# Patient Record
Sex: MALE | Race: WHITE | NOT HISPANIC OR LATINO | Employment: UNEMPLOYED | URBAN - METROPOLITAN AREA
[De-identification: names, ages, dates, MRNs, and addresses within clinical notes are randomized per-mention and may not be internally consistent; named-entity substitution may affect disease eponyms.]

---

## 2017-01-27 ENCOUNTER — INFUSION - HEALTHEAST (OUTPATIENT)
Dept: INFUSION THERAPY | Facility: HOSPITAL | Age: 47
End: 2017-01-27

## 2017-01-27 DIAGNOSIS — M81.0 OSTEOPOROSIS: ICD-10-CM

## 2017-01-27 LAB
CREAT SERPL-MCNC: 0.64 MG/DL (ref 0.7–1.3)
GFR SERPL CREATININE-BSD FRML MDRD: >60 ML/MIN/1.73M2

## 2017-04-28 ENCOUNTER — INFUSION - HEALTHEAST (OUTPATIENT)
Dept: INFUSION THERAPY | Facility: HOSPITAL | Age: 47
End: 2017-04-28

## 2017-04-28 DIAGNOSIS — M81.0 OSTEOPOROSIS: ICD-10-CM

## 2017-04-28 LAB
CREAT SERPL-MCNC: 0.67 MG/DL (ref 0.7–1.3)
GFR SERPL CREATININE-BSD FRML MDRD: >60 ML/MIN/1.73M2

## 2017-05-30 ENCOUNTER — RECORDS - HEALTHEAST (OUTPATIENT)
Dept: ADMINISTRATIVE | Facility: OTHER | Age: 47
End: 2017-05-30

## 2017-06-02 ENCOUNTER — HOSPITAL ENCOUNTER (OUTPATIENT)
Dept: RADIOLOGY | Facility: HOSPITAL | Age: 47
Discharge: HOME | End: 2017-06-02
Attending: FAMILY MEDICINE

## 2017-06-02 DIAGNOSIS — J18.9 PNEUMONIA: ICD-10-CM

## 2017-06-21 ENCOUNTER — RECORDS - HEALTHEAST (OUTPATIENT)
Dept: ADMINISTRATIVE | Facility: OTHER | Age: 47
End: 2017-06-21

## 2017-07-07 ENCOUNTER — HOSPITAL ENCOUNTER (OUTPATIENT)
Dept: RADIOLOGY | Facility: HOSPITAL | Age: 47
Discharge: HOME | End: 2017-07-07
Attending: FAMILY MEDICINE

## 2017-07-07 DIAGNOSIS — R93.89 ABNORMAL CHEST X-RAY: ICD-10-CM

## 2017-07-27 ENCOUNTER — RECORDS - HEALTHEAST (OUTPATIENT)
Dept: ADMINISTRATIVE | Facility: OTHER | Age: 47
End: 2017-07-27

## 2017-07-27 ENCOUNTER — AMBULATORY - HEALTHEAST (OUTPATIENT)
Dept: INFUSION THERAPY | Facility: HOSPITAL | Age: 47
End: 2017-07-27

## 2017-07-27 ENCOUNTER — COMMUNICATION - HEALTHEAST (OUTPATIENT)
Dept: INFUSION THERAPY | Facility: HOSPITAL | Age: 47
End: 2017-07-27

## 2017-07-27 DIAGNOSIS — M81.0 OSTEOPOROSIS: ICD-10-CM

## 2017-07-28 ENCOUNTER — INFUSION - HEALTHEAST (OUTPATIENT)
Dept: INFUSION THERAPY | Facility: HOSPITAL | Age: 47
End: 2017-07-28

## 2017-07-28 DIAGNOSIS — M81.0 OSTEOPOROSIS: ICD-10-CM

## 2017-07-28 LAB
CREAT SERPL-MCNC: 0.63 MG/DL (ref 0.7–1.3)
GFR SERPL CREATININE-BSD FRML MDRD: >60 ML/MIN/1.73M2

## 2017-10-24 ENCOUNTER — RECORDS - HEALTHEAST (OUTPATIENT)
Dept: ADMINISTRATIVE | Facility: OTHER | Age: 47
End: 2017-10-24

## 2018-02-20 ENCOUNTER — RECORDS - HEALTHEAST (OUTPATIENT)
Dept: ADMINISTRATIVE | Facility: OTHER | Age: 48
End: 2018-02-20

## 2018-03-13 ENCOUNTER — RECORDS - HEALTHEAST (OUTPATIENT)
Dept: ADMINISTRATIVE | Facility: OTHER | Age: 48
End: 2018-03-13

## 2018-03-30 ENCOUNTER — RECORDS - HEALTHEAST (OUTPATIENT)
Dept: ADMINISTRATIVE | Facility: OTHER | Age: 48
End: 2018-03-30

## 2018-04-02 ENCOUNTER — COMMUNICATION - HEALTHEAST (OUTPATIENT)
Dept: ADMINISTRATIVE | Facility: CLINIC | Age: 48
End: 2018-04-02

## 2018-04-02 ENCOUNTER — RECORDS - HEALTHEAST (OUTPATIENT)
Dept: ADMINISTRATIVE | Facility: OTHER | Age: 48
End: 2018-04-02

## 2018-04-10 ENCOUNTER — RECORDS - HEALTHEAST (OUTPATIENT)
Dept: ADMINISTRATIVE | Facility: OTHER | Age: 48
End: 2018-04-10

## 2018-06-20 ENCOUNTER — AMBULATORY - HEALTHEAST (OUTPATIENT)
Dept: LAB | Facility: HOSPITAL | Age: 48
End: 2018-06-20

## 2018-06-20 DIAGNOSIS — G40.802 CURSIVE EPILEPSY (H): ICD-10-CM

## 2018-06-20 DIAGNOSIS — Z51.81 ENCOUNTER FOR THERAPEUTIC DRUG MONITORING: ICD-10-CM

## 2018-06-20 LAB
ALBUMIN SERPL-MCNC: 3.1 G/DL (ref 3.5–5)
ALP SERPL-CCNC: 71 U/L (ref 45–120)
ALT SERPL W P-5'-P-CCNC: 10 U/L (ref 0–45)
AST SERPL W P-5'-P-CCNC: 23 U/L (ref 0–40)
BASOPHILS # BLD AUTO: 0 THOU/UL (ref 0–0.2)
BASOPHILS NFR BLD AUTO: 0 % (ref 0–2)
BILIRUB DIRECT SERPL-MCNC: 0.2 MG/DL
BILIRUB SERPL-MCNC: 0.3 MG/DL (ref 0–1)
CARBAMAZEPINE SERPL-MCNC: 5.8 UG/ML (ref 4–12)
EOSINOPHIL # BLD AUTO: 0 THOU/UL (ref 0–0.4)
EOSINOPHIL NFR BLD AUTO: 0 % (ref 0–6)
ERYTHROCYTE [DISTWIDTH] IN BLOOD BY AUTOMATED COUNT: 12.6 % (ref 11–14.5)
HCT VFR BLD AUTO: 39.8 % (ref 40–54)
HGB BLD-MCNC: 13.2 G/DL (ref 14–18)
LEVETIRACETAM (KEPPRA): 28.6 UG/ML (ref 6–46)
LYMPHOCYTES # BLD AUTO: 1.4 THOU/UL (ref 0.8–4.4)
LYMPHOCYTES NFR BLD AUTO: 15 % (ref 20–40)
MCH RBC QN AUTO: 32.8 PG (ref 27–34)
MCHC RBC AUTO-ENTMCNC: 33.2 G/DL (ref 32–36)
MCV RBC AUTO: 99 FL (ref 80–100)
MONOCYTES # BLD AUTO: 1.4 THOU/UL (ref 0–0.9)
MONOCYTES NFR BLD AUTO: 15 % (ref 2–10)
NEUTROPHILS # BLD AUTO: 6.5 THOU/UL (ref 2–7.7)
NEUTROPHILS NFR BLD AUTO: 70 % (ref 50–70)
PLATELET # BLD AUTO: 142 THOU/UL (ref 140–440)
PMV BLD AUTO: 8.8 FL (ref 8.5–12.5)
PROT SERPL-MCNC: 8.3 G/DL (ref 6–8)
RBC # BLD AUTO: 4.02 MILL/UL (ref 4.4–6.2)
VALPROATE SERPL-MCNC: 90.5 UG/ML (ref 50–150)
WBC: 9.4 THOU/UL (ref 4–11)

## 2018-06-21 LAB
SPECIMEN STATUS: NORMAL
VALPROATE FREE SERPL-MCNC: 18.9 UG/ML (ref 6–20)

## 2018-06-27 ENCOUNTER — RECORDS - HEALTHEAST (OUTPATIENT)
Dept: ADMINISTRATIVE | Facility: OTHER | Age: 48
End: 2018-06-27

## 2018-07-17 ENCOUNTER — RECORDS - HEALTHEAST (OUTPATIENT)
Dept: ADMINISTRATIVE | Facility: OTHER | Age: 48
End: 2018-07-17

## 2018-07-17 ENCOUNTER — OFFICE VISIT - HEALTHEAST (OUTPATIENT)
Dept: ENDOCRINOLOGY | Facility: CLINIC | Age: 48
End: 2018-07-17

## 2018-07-17 DIAGNOSIS — M81.0 OSTEOPOROSIS: ICD-10-CM

## 2018-07-19 ENCOUNTER — COMMUNICATION - HEALTHEAST (OUTPATIENT)
Dept: ENDOCRINOLOGY | Facility: CLINIC | Age: 48
End: 2018-07-19

## 2018-07-27 ENCOUNTER — AMBULATORY - HEALTHEAST (OUTPATIENT)
Dept: ENDOCRINOLOGY | Facility: CLINIC | Age: 48
End: 2018-07-27

## 2018-07-27 DIAGNOSIS — M81.0 OSTEOPOROSIS: ICD-10-CM

## 2018-09-13 ENCOUNTER — RECORDS - HEALTHEAST (OUTPATIENT)
Dept: LAB | Facility: HOSPITAL | Age: 48
End: 2018-09-13

## 2018-09-13 LAB
ALBUMIN SERPL-MCNC: 3.2 G/DL (ref 3.5–5)
ALP SERPL-CCNC: 84 U/L (ref 45–120)
ALT SERPL W P-5'-P-CCNC: 11 U/L (ref 0–45)
ANION GAP SERPL CALCULATED.3IONS-SCNC: 7 MMOL/L (ref 5–18)
AST SERPL W P-5'-P-CCNC: 24 U/L (ref 0–40)
BASOPHILS # BLD AUTO: 0 THOU/UL (ref 0–0.2)
BASOPHILS NFR BLD AUTO: 0 % (ref 0–2)
BILIRUB SERPL-MCNC: 0.4 MG/DL (ref 0–1)
BUN SERPL-MCNC: 22 MG/DL (ref 8–22)
CALCIUM SERPL-MCNC: 9.6 MG/DL (ref 8.5–10.5)
CHLORIDE BLD-SCNC: 104 MMOL/L (ref 98–107)
CO2 SERPL-SCNC: 34 MMOL/L (ref 22–31)
CREAT SERPL-MCNC: 0.66 MG/DL (ref 0.7–1.3)
EOSINOPHIL # BLD AUTO: 0.1 THOU/UL (ref 0–0.4)
EOSINOPHIL NFR BLD AUTO: 1 % (ref 0–6)
ERYTHROCYTE [DISTWIDTH] IN BLOOD BY AUTOMATED COUNT: 12.6 % (ref 11–14.5)
GFR SERPL CREATININE-BSD FRML MDRD: >60 ML/MIN/1.73M2
GLUCOSE BLD-MCNC: 104 MG/DL (ref 70–125)
HCT VFR BLD AUTO: 41.7 % (ref 40–54)
HGB BLD-MCNC: 13.4 G/DL (ref 14–18)
LYMPHOCYTES # BLD AUTO: 1.6 THOU/UL (ref 0.8–4.4)
LYMPHOCYTES NFR BLD AUTO: 23 % (ref 20–40)
MCH RBC QN AUTO: 32.3 PG (ref 27–34)
MCHC RBC AUTO-ENTMCNC: 32.1 G/DL (ref 32–36)
MCV RBC AUTO: 101 FL (ref 80–100)
MONOCYTES # BLD AUTO: 0.7 THOU/UL (ref 0–0.9)
MONOCYTES NFR BLD AUTO: 10 % (ref 2–10)
NEUTROPHILS # BLD AUTO: 4.5 THOU/UL (ref 2–7.7)
NEUTROPHILS NFR BLD AUTO: 65 % (ref 50–70)
PLATELET # BLD AUTO: 211 THOU/UL (ref 140–440)
PMV BLD AUTO: 9.2 FL (ref 8.5–12.5)
POTASSIUM BLD-SCNC: 4.1 MMOL/L (ref 3.5–5)
PROT SERPL-MCNC: 8.5 G/DL (ref 6–8)
RBC # BLD AUTO: 4.15 MILL/UL (ref 4.4–6.2)
SODIUM SERPL-SCNC: 145 MMOL/L (ref 136–145)
TSH SERPL DL<=0.005 MIU/L-ACNC: 1.47 UIU/ML (ref 0.3–5)
WBC: 6.9 THOU/UL (ref 4–11)

## 2018-09-19 ENCOUNTER — AMBULATORY - HEALTHEAST (OUTPATIENT)
Dept: LAB | Facility: CLINIC | Age: 48
End: 2018-09-19

## 2018-09-19 DIAGNOSIS — R63.4 ABNORMAL WEIGHT LOSS: ICD-10-CM

## 2018-09-19 DIAGNOSIS — R41.82 ALTERED MENTAL STATUS: ICD-10-CM

## 2018-09-19 LAB
ALBUMIN UR-MCNC: NEGATIVE MG/DL
APPEARANCE UR: CLEAR
BILIRUB UR QL STRIP: NEGATIVE
COLOR UR AUTO: ABNORMAL
GLUCOSE UR STRIP-MCNC: NEGATIVE MG/DL
HGB UR QL STRIP: NEGATIVE
KETONES UR STRIP-MCNC: NEGATIVE MG/DL
LEUKOCYTE ESTERASE UR QL STRIP: NEGATIVE
NITRATE UR QL: NEGATIVE
PH UR STRIP: 6 [PH] (ref 4.5–8)
SP GR UR STRIP: 1.02 (ref 1–1.03)
UROBILINOGEN UR STRIP-ACNC: ABNORMAL

## 2018-09-20 LAB — BACTERIA SPEC CULT: NO GROWTH

## 2018-10-30 ENCOUNTER — RECORDS - HEALTHEAST (OUTPATIENT)
Dept: LAB | Facility: CLINIC | Age: 48
End: 2018-10-30

## 2018-10-30 LAB
ALBUMIN SERPL-MCNC: 3.2 G/DL (ref 3.5–5)
ALP SERPL-CCNC: 88 U/L (ref 45–120)
ALT SERPL W P-5'-P-CCNC: 13 U/L (ref 0–45)
ANION GAP SERPL CALCULATED.3IONS-SCNC: 11 MMOL/L (ref 5–18)
AST SERPL W P-5'-P-CCNC: 28 U/L (ref 0–40)
BILIRUB SERPL-MCNC: 0.3 MG/DL (ref 0–1)
BUN SERPL-MCNC: 26 MG/DL (ref 8–22)
CALCIUM SERPL-MCNC: 9.4 MG/DL (ref 8.5–10.5)
CHLORIDE BLD-SCNC: 106 MMOL/L (ref 98–107)
CO2 SERPL-SCNC: 25 MMOL/L (ref 22–31)
CREAT SERPL-MCNC: 0.68 MG/DL (ref 0.7–1.3)
GFR SERPL CREATININE-BSD FRML MDRD: >60 ML/MIN/1.73M2
GLUCOSE BLD-MCNC: 87 MG/DL (ref 70–125)
POTASSIUM BLD-SCNC: 3.8 MMOL/L (ref 3.5–5)
PROT SERPL-MCNC: 8.2 G/DL (ref 6–8)
SODIUM SERPL-SCNC: 142 MMOL/L (ref 136–145)

## 2018-11-01 LAB
ERYTHROCYTE [DISTWIDTH] IN BLOOD BY AUTOMATED COUNT: 12.4 % (ref 11–14.5)
HCT VFR BLD AUTO: 40.5 % (ref 40–54)
HGB BLD-MCNC: 13.2 G/DL (ref 14–18)
MCH RBC QN AUTO: 32.4 PG (ref 27–34)
MCHC RBC AUTO-ENTMCNC: 32.6 G/DL (ref 32–36)
MCV RBC AUTO: 99 FL (ref 80–100)
PLATELET # BLD AUTO: 159 THOU/UL (ref 140–440)
PMV BLD AUTO: 8.5 FL (ref 8.5–12.5)
RBC # BLD AUTO: 4.08 MILL/UL (ref 4.4–6.2)
WBC: 4.7 THOU/UL (ref 4–11)

## 2019-01-25 ENCOUNTER — COMMUNICATION - HEALTHEAST (OUTPATIENT)
Dept: ENDOCRINOLOGY | Facility: CLINIC | Age: 49
End: 2019-01-25

## 2019-02-06 ENCOUNTER — AMBULATORY - HEALTHEAST (OUTPATIENT)
Dept: ENDOCRINOLOGY | Facility: CLINIC | Age: 49
End: 2019-02-06

## 2019-07-22 ENCOUNTER — AMBULATORY - HEALTHEAST (OUTPATIENT)
Dept: LAB | Facility: HOSPITAL | Age: 49
End: 2019-07-22

## 2019-07-22 DIAGNOSIS — Z51.81 ENCOUNTER FOR THERAPEUTIC DRUG LEVEL MONITORING: ICD-10-CM

## 2019-07-22 LAB
ALBUMIN SERPL-MCNC: 2.9 G/DL (ref 3.5–5)
ALP SERPL-CCNC: 82 U/L (ref 45–120)
ALT SERPL W P-5'-P-CCNC: 10 U/L (ref 0–45)
AST SERPL W P-5'-P-CCNC: 20 U/L (ref 0–40)
BASOPHILS # BLD AUTO: 0 THOU/UL (ref 0–0.2)
BASOPHILS NFR BLD AUTO: 0 % (ref 0–2)
BILIRUB DIRECT SERPL-MCNC: 0.2 MG/DL
BILIRUB SERPL-MCNC: 0.3 MG/DL (ref 0–1)
CARBAMAZEPINE SERPL-MCNC: 5.5 UG/ML (ref 4–12)
EOSINOPHIL # BLD AUTO: 0 THOU/UL (ref 0–0.4)
EOSINOPHIL NFR BLD AUTO: 1 % (ref 0–6)
ERYTHROCYTE [DISTWIDTH] IN BLOOD BY AUTOMATED COUNT: 12.6 % (ref 11–14.5)
HCT VFR BLD AUTO: 39.7 % (ref 40–54)
HGB BLD-MCNC: 12.9 G/DL (ref 14–18)
LYMPHOCYTES # BLD AUTO: 1 THOU/UL (ref 0.8–4.4)
LYMPHOCYTES NFR BLD AUTO: 18 % (ref 20–40)
MCH RBC QN AUTO: 32.4 PG (ref 27–34)
MCHC RBC AUTO-ENTMCNC: 32.5 G/DL (ref 32–36)
MCV RBC AUTO: 100 FL (ref 80–100)
MONOCYTES # BLD AUTO: 0.9 THOU/UL (ref 0–0.9)
MONOCYTES NFR BLD AUTO: 16 % (ref 2–10)
NEUTROPHILS # BLD AUTO: 3.8 THOU/UL (ref 2–7.7)
NEUTROPHILS NFR BLD AUTO: 66 % (ref 50–70)
PLATELET # BLD AUTO: 146 THOU/UL (ref 140–440)
PMV BLD AUTO: 8.5 FL (ref 8.5–12.5)
PROT SERPL-MCNC: 8.4 G/DL (ref 6–8)
RBC # BLD AUTO: 3.98 MILL/UL (ref 4.4–6.2)
VALPROATE SERPL-MCNC: 84.1 UG/ML (ref 50–150)
WBC: 5.8 THOU/UL (ref 4–11)

## 2019-07-23 LAB
SPECIMEN STATUS: NORMAL
VALPROATE FREE SERPL-MCNC: 18.9 UG/ML (ref 6–20)

## 2019-08-12 ENCOUNTER — AMBULATORY - HEALTHEAST (OUTPATIENT)
Dept: ENDOCRINOLOGY | Facility: CLINIC | Age: 49
End: 2019-08-12

## 2020-01-27 ENCOUNTER — COMMUNICATION - HEALTHEAST (OUTPATIENT)
Dept: ADMINISTRATIVE | Facility: CLINIC | Age: 50
End: 2020-01-27

## 2020-01-27 DIAGNOSIS — M81.0 OSTEOPOROSIS: ICD-10-CM

## 2020-02-25 ENCOUNTER — AMBULATORY - HEALTHEAST (OUTPATIENT)
Dept: SCHEDULING | Facility: CLINIC | Age: 50
End: 2020-02-25

## 2020-02-25 DIAGNOSIS — M81.0 OSTEOPOROSIS: ICD-10-CM

## 2020-03-17 ENCOUNTER — COMMUNICATION - HEALTHEAST (OUTPATIENT)
Dept: ENDOCRINOLOGY | Facility: CLINIC | Age: 50
End: 2020-03-17

## 2020-05-11 ENCOUNTER — AMBULATORY - HEALTHEAST (OUTPATIENT)
Dept: ENDOCRINOLOGY | Facility: CLINIC | Age: 50
End: 2020-05-11

## 2020-05-11 DIAGNOSIS — Z92.29 PERSONAL HISTORY OF OTHER DRUG THERAPY: ICD-10-CM

## 2020-05-11 DIAGNOSIS — M81.0 OSTEOPOROSIS: ICD-10-CM

## 2020-05-21 ENCOUNTER — AMBULATORY - HEALTHEAST (OUTPATIENT)
Dept: LAB | Facility: CLINIC | Age: 50
End: 2020-05-21

## 2020-05-21 DIAGNOSIS — M81.0 OSTEOPOROSIS: ICD-10-CM

## 2020-05-21 LAB — CALCIUM SERPL-MCNC: 10.3 MG/DL (ref 8.5–10.5)

## 2020-05-22 LAB — 25(OH)D3 SERPL-MCNC: 63 NG/ML (ref 30–80)

## 2020-05-28 ENCOUNTER — OFFICE VISIT - HEALTHEAST (OUTPATIENT)
Dept: ENDOCRINOLOGY | Facility: CLINIC | Age: 50
End: 2020-05-28

## 2020-05-28 DIAGNOSIS — M81.0 OSTEOPOROSIS WITHOUT CURRENT PATHOLOGICAL FRACTURE, UNSPECIFIED OSTEOPOROSIS TYPE: ICD-10-CM

## 2020-07-09 ENCOUNTER — AMBULATORY - HEALTHEAST (OUTPATIENT)
Dept: ENDOCRINOLOGY | Facility: CLINIC | Age: 50
End: 2020-07-09

## 2021-01-01 ENCOUNTER — RECORDS - HEALTHEAST (OUTPATIENT)
Dept: ADMINISTRATIVE | Facility: CLINIC | Age: 51
End: 2021-01-01

## 2021-01-01 ENCOUNTER — AMBULATORY - HEALTHEAST (OUTPATIENT)
Dept: ENDOCRINOLOGY | Facility: CLINIC | Age: 51
End: 2021-01-01

## 2021-01-01 ENCOUNTER — RECORDS - HEALTHEAST (OUTPATIENT)
Dept: ADMINISTRATIVE | Facility: OTHER | Age: 51
End: 2021-01-01

## 2021-01-01 ENCOUNTER — TRANSCRIBE ORDERS (OUTPATIENT)
Dept: ENDOCRINOLOGY | Facility: CLINIC | Age: 51
End: 2021-01-01

## 2021-01-01 ENCOUNTER — AMBULATORY - HEALTHEAST (OUTPATIENT)
Dept: LAB | Facility: CLINIC | Age: 51
End: 2021-01-01

## 2021-01-01 ENCOUNTER — ALLIED HEALTH/NURSE VISIT (OUTPATIENT)
Dept: ENDOCRINOLOGY | Facility: CLINIC | Age: 51
End: 2021-01-01
Payer: MEDICARE

## 2021-01-01 ENCOUNTER — COMMUNICATION - HEALTHEAST (OUTPATIENT)
Dept: LAB | Facility: CLINIC | Age: 51
End: 2021-01-01

## 2021-01-01 ENCOUNTER — COMMUNICATION - HEALTHEAST (OUTPATIENT)
Dept: ADMINISTRATIVE | Facility: CLINIC | Age: 51
End: 2021-01-01

## 2021-01-01 VITALS — BODY MASS INDEX: 22.3 KG/M2 | WEIGHT: 92.5 LBS

## 2021-01-01 VITALS — WEIGHT: 82.9 LBS | BODY MASS INDEX: 18.59 KG/M2

## 2021-01-01 VITALS — BODY MASS INDEX: 21.46 KG/M2 | WEIGHT: 89 LBS

## 2021-01-01 DIAGNOSIS — Z92.29 PERSONAL HISTORY OF OTHER DRUG THERAPY: ICD-10-CM

## 2021-01-01 DIAGNOSIS — M81.0 OSTEOPOROSIS WITHOUT CURRENT PATHOLOGICAL FRACTURE, UNSPECIFIED OSTEOPOROSIS TYPE: ICD-10-CM

## 2021-01-01 DIAGNOSIS — M81.0 OSTEOPOROSIS WITHOUT CURRENT PATHOLOGICAL FRACTURE, UNSPECIFIED OSTEOPOROSIS TYPE: Primary | ICD-10-CM

## 2021-01-01 LAB
25(OH)D3 SERPL-MCNC: 66.6 NG/ML (ref 30–80)
CALCIUM SERPL-MCNC: 8.9 MG/DL (ref 8.5–10.5)

## 2021-01-01 PROCEDURE — 96372 THER/PROPH/DIAG INJ SC/IM: CPT | Performed by: PHARMACIST

## 2021-01-01 PROCEDURE — 99207 PR NO CHARGE NURSE ONLY: CPT

## 2021-01-22 ENCOUNTER — AMBULATORY - HEALTHEAST (OUTPATIENT)
Dept: ENDOCRINOLOGY | Facility: CLINIC | Age: 51
End: 2021-01-22

## 2021-01-22 DIAGNOSIS — Z92.29 PERSONAL HISTORY OF OTHER DRUG THERAPY: ICD-10-CM

## 2021-01-22 DIAGNOSIS — M81.0 OSTEOPOROSIS WITHOUT CURRENT PATHOLOGICAL FRACTURE, UNSPECIFIED OSTEOPOROSIS TYPE: ICD-10-CM

## 2021-01-26 ENCOUNTER — RECORDS - HEALTHEAST (OUTPATIENT)
Dept: LAB | Facility: CLINIC | Age: 51
End: 2021-01-26

## 2021-01-26 LAB
CHOLEST SERPL-MCNC: 191 MG/DL
FASTING STATUS PATIENT QL REPORTED: NORMAL
HDLC SERPL-MCNC: 70 MG/DL
LDLC SERPL CALC-MCNC: 105 MG/DL
TRIGL SERPL-MCNC: 81 MG/DL

## 2021-02-02 ENCOUNTER — AMBULATORY - HEALTHEAST (OUTPATIENT)
Dept: ENDOCRINOLOGY | Facility: CLINIC | Age: 51
End: 2021-02-02

## 2021-02-06 ENCOUNTER — RECORDS - HEALTHEAST (OUTPATIENT)
Dept: LAB | Facility: CLINIC | Age: 51
End: 2021-02-06

## 2021-02-06 LAB
SARS-COV-2 PCR COMMENT: NORMAL
SARS-COV-2 RNA SPEC QL NAA+PROBE: NEGATIVE
SARS-COV-2 VIRUS SPECIMEN SOURCE: NORMAL

## 2021-05-31 NOTE — PROGRESS NOTES

## 2021-06-05 NOTE — TELEPHONE ENCOUNTER
BK    Previous pt of Dr Marrufo. Pt is due for an injection in Feb. Due for a Dexa. I did notify [facility] that it is up to you if you can take on pt or not.     I did verify insurance, Medicare and [employer]. Please check for PA/PP.    Please call / [facility], [facility] @ [phone].

## 2021-06-05 NOTE — TELEPHONE ENCOUNTER
DEXA first with labs (I assume), then appt w/ provider and possible prolia.    Since he is due for Prolia in Feb, are we DB-ing? Or ok for 1st available?

## 2021-06-05 NOTE — TELEPHONE ENCOUNTER
Appt made:  Date: 5/22/2020 Status: [facility]   Time: 11:20 AM Length: 40   Visit Type: OFFICE VISIT LONG [[id number]] Copay: $0.00   Provider: Kathy Ramírez NP           Notified that Central Scheduling of Radiology will give them a call. If no word to call them and to schedule 3 weeks prior to their appt.

## 2021-06-08 NOTE — PROGRESS NOTES
"Yan Palacio is a 49 y.o. male who is being evaluated via a billable video visit.      The patient has been notified of following:     \"This video visit will be conducted via a call between you and your physician/provider. We have found that certain health care needs can be provided without the need for an in-person physical exam.  This service lets us provide the care you need with a video conversation.  If a prescription is necessary we can send it directly to your pharmacy.  If lab work is needed we can place an order for that and you can then stop by our lab to have the test done at a later time.    Video visits are billed at different rates depending on your insurance coverage. Please reach out to your insurance provider with any questions.    If during the course of the call the physician/provider feels a video visit is not appropriate, you will not be charged for this service.\"    Patient has given verbal consent to a Video visit? Yes    Patient would like to receive their AVS by AVS Preference: Yamil.    Patient would like the video invitation sent by: Text to cell phone: [phone]    Will anyone else be joining your video visit? No        Video Start Time: 10:33 AM    Additional provider notes:      Reason for visit      1. Osteoporosis without current pathological fracture, unspecified osteoporosis type        History     Yan Palacio is a very pleasant 49 y.o. old male who presents for follow up.   SUMMARY:    Daron is contacted via Video Visit today in f/u for Osteoporosis. He has CP and is wheelchair bound. It was determined that the Osteoporosis was likely because of being non-ambulatory and being on anti-convulsants. He has been getting Prolia injections since starting care in 2018and has had 4. To his caregivers knowledge, he has had no problems with them. His last Dexa Scan showed:The spine bone density L1-L4 with T-score -2.1.  The Z-score is -1.9.2. Femoral bone densities show left " total hip T- score -3.5 with a Z-score of -3.1.  The right total hip could not be accurately assessed due to difficulty with placement in the scanner..3. Trabecular bone score indicates moderate trabecular bone architecture. This was done on a different scanner and they were unable to compare with the last one.  Current Vit D level is 63, Calcium level is 10.3.       Risk Factors     The following high- risk conditions have been ruled out: celiac disease, eating disorders, gastric bypass, hyperparathyroidism, inflammatory bowel disease, hyperthyroidism, rheumatoid arthritis, lupus, chronic kidney disease.    Yan Palacio has the following risk factors: Age,  and Low BMI    He is not on high risk medications such as glucocorticoids, anti-coagulants, chemotherapy or levothyroxine.        Past Medical History     Patient Active Problem List   Diagnosis     Hypernatremia     Seizures (H)     Mental disability     Cerebral palsy (H)     Status epilepticus (H)     Chronic anemia     Leucopenia     Hypomagnesemia     CAP (community acquired pneumonia)     Spastic quadriplegic cerebral palsy (H)     Seizure disorder (H)     At risk for aspiration     Aspiration into lower respiratory tract     Osteoporosis     Developmental delay     Nystagmus       Family History       family history includes Cerebral palsy in his sister; Other in his father and mother.    Social History      reports that he has never smoked. He has never used smokeless tobacco. He reports that he does not drink alcohol or use drugs.      Review of Systems     Patient denies current pain, limited mobility, fractures.   Remainder per HPI.          Assessment     1. Osteoporosis without current pathological fracture, unspecified osteoporosis type        Plan     Daron will remain on the Prolia injections for the time being. He will get his next one in July. He will f/u with me with labs in 1 year.         Current Medications     Outpatient  Medications Prior to Visit   Medication Sig Dispense Refill     adapalene (DIFFERIN) 0.1 % cream Apply 1 application topically bedtime.       calcium, as carbonate, (TUMS) 200 mg calcium (500 mg) chewable tablet Chew 1 tablet 2 (two) times a day.       caloric supplement (BENECALORIE ORAL) Take by mouth. Take twice daily       carBAMazepine (TEGRETOL) 200 mg tablet Take 300 mg by mouth 3 (three) times a day. morning and evening       cholecalciferol, vitamin D3, 1,000 unit tablet Take 1,000 Units by mouth daily.       divalproex (DEPAKOTE SPRINKLE) 125 mg capsule Take 1,250 mg by mouth 3 (three) times a day. 10 x 125mg caps , open caps       fluticasone (FLONASE) 50 mcg/actuation nasal spray 1 spray into each nostril daily.       lactulose 10 gram/15 mL solution Take 20 g by mouth 2 (two) times a day.       levETIRAcetam (KEPPRA) 500 MG tablet Take 1,000 mg by mouth 2 (two) times a day.        loratadine (CLARITIN) 10 mg tablet Take 10 mg by mouth daily. From April-September every year and as needed       LORazepam (ATIVAN) 2 mg/mL concentrated solution Take 1 mg by mouth as needed (for seizures).       mineral oil liquid Administer 1 mL into both ears once a week. On saturdays       omeprazole (PRILOSEC) 20 MG capsule Take 20 mg by mouth every evening.       polyethylene glycol (MIRALAX) 17 gram packet Take 17 g by mouth daily.       ibandronate (BONIVA) 3 mg/3 mL Syrg Infuse 3 mg into a venous catheter every 3 (three) months.       acetaminophen (TYLENOL) 500 MG tablet Take 1,000 mg by mouth every 6 (six) hours as needed for pain.       acyclovir (ZOVIRAX) 400 MG tablet Take 400 mg by mouth as needed (3 times daily x 5 days if needed fdor cold sores).       bacitracin ointment Apply 1 application topically daily as needed (open areas from scratches).       bisacodyl (DULCOLAX) 10 mg suppository Insert 10 mg into the rectum daily as needed.       erythromycin with ethanol (EMGEL) 2 % gel Apply topically daily as  needed (for acne).       ondansetron (ZOFRAN-ODT) 4 MG disintegrating tablet Place 4 mg under the tongue every 8 (eight) hours as needed.       carBAMazepine (TEGRETOL) 200 mg tablet Take 100 mg by mouth 3 (three) times a day.        Facility-Administered Medications Prior to Visit   Medication Dose Route Frequency Provider Last Rate Last Dose     denosumab 60 mg (PROLIA 60 mg/ml)  60 mg Subcutaneous Q6 Months Kathy Ramírez, EMELIA         denosumab 60 mg (PROLIA 60 mg/ml)  60 mg Subcutaneous Q6 Months Serjio Marrufo MD   60 mg at 08/12/19 1104         Lab Results     TSH   Date Value Ref Range Status   09/13/2018 1.47 0.30 - 5.00 uIU/mL Final     Calcium   Date Value Ref Range Status   05/21/2020 10.3 8.5 - 10.5 mg/dL Final     Phosphorus   Date Value Ref Range Status   05/26/2015 3.9 2.5 - 4.5 mg/dL Final           Imaging Results   Last DEXA scan:  Results for orders placed in visit on 02/25/20   DXA Bone Density Scan    Narrative 3/13/2020      RE: Yan Palacio  YOB: 1970        Dear Kathy Ramírez,    Patient Profile:  49 y.o. male, is here for the first bone density test.   History of fractures ?- Yes. Family history of osteoporosis - Yes.  Family   history of hip fracture: Unknown. Smoking history - No. Osteoporosis   treatment past ?-  No. Osteoporosis treatment current ?- Yes;  Prolia.    Chronic medical problems - None. High risk medications ?-  Anti-seizure;    Yes, Currently.      Assessment:    1. The spine bone density L1-L4 with T-score -2.1.  The Z-score is -1.9.  2. Femoral bone densities show left total hip T- score -3.5 with a Z-score   of -3.1.  The right total hip could not be accurately assessed due to   difficulty with placement in the scanner..  3. Trabecular bone score indicates moderate trabecular bone architecture.      49 y.o. male with OSTEOPOROSIS and HIGH fracture risk.     The previous bone densitometry was performed on a different scanner-(2016)   and  therefore, the results are not directly comparable.    Recommendations:  A continuation of the current treatment is recommended with follow up bone   density scan in 1 to 2 years.      Bone densitometry was performed on your patient using our [employer] iDXA   densitometer. The results are summarized and a copy of the actual scans   are included for your review. In conformity with the International Society   of Clinical Densitometry's most recent position statement for DXA   interpretation (2015), the diagnosis will be made on the lowest measured   T-score of the lumbar spine, femoral neck, total proximal femur or 33%   radius. Note the change in terminology for diagnostic classification from   OSTEOPENIA to LOW BONE MASS. All trending for sequential exams will be   done using multiple vertebrae or the total proximal femur. Fracture risk   is based on the WHO Fracture Risk Assessment Tool (FRAX). If additional   information is needed or if you would like to discuss the results, please   do not hesitate to call me.       Thank you for referring this patient to [facility].   We are happy to be of service in support of you and your practice. If you   have any questions or suggestions to improve our service, please call me   at [phone].     Sincerely,     Aliyah Escalante M.D. C.C.D.  Osteoporosis Services, [facility]         Video-Visit Details    Type of service:  Video Visit    Video End Time (time video stopped): 1050  Originating Location (pt. Location): Home    Distant Location (provider location):  Formerly named [facility] ENDOCRINOLOGY     Platform used for Video Visit: Mily POLANCO

## 2021-06-08 NOTE — PROGRESS NOTES
Arrived via w/c at 10:28 and pt remained in his w/c - accompanied by Rosalba from the home where this patient resides. Reviewed plan of care. With two nurses, a creat was drawn, and obtained the result of 0.64 - most recent weight is 89 lbs. Placed 24g IV in left wrist on first attempt and administered ibandronate 3mg IVP without problem - Rosalba assisted this writer with holding pt's arm still. Dc'd the IV and wrapped site securely with gauze and coban. While in [facility] Infusion, the pt would sleep at intervals, laugh at times, or call out in a highpitched sound. VSS. Arranged for next visit in 3 months on Friday April 28th. Printed AVS given to care attendant - and pt left unit in stable condition via w/c at 12:21.    Elsie Ansari RN

## 2021-06-09 NOTE — PROGRESS NOTES

## 2021-06-10 NOTE — PROGRESS NOTES
Pt arrived via the w/c with his care taker, verified name, birthdate, Iv started in lt ante, labs drawn, ok for boniva, Which was given over 30 sec miladys well, IV d/cd after, and AVs given PT left via his w/c with the caretaker at 1145, pt is non verbal, spastic movements, non verbal  Rosangela Harman

## 2021-06-12 NOTE — PROGRESS NOTES
1030 Yan arrived via his w/c - accompanied by caregiver. Reviewed ID/ plan of care. With two nurses, a creat was drawn,  Result 0.63  Yan has been losing weight and caregiver states his wt on 7/26 was 82.9lb.  Placed 24g IV R AC on first attempt and administered ibandronate 3mg IVP  - 2nd RN assisted writer to hold Celia arm.  IV dc'd and site covered with cottonball/papertape. While in [facility] Infusion, the pt would sleep at intervals, laugh at times, or call out in a highpitched sound. VSS.   Yan would be due to Boniva in October then again in January. Caregiver today stated Yan is having his dental in March and per the Dentist, pt should not have Oct or January Boniva and the group home will call Infusion to schedule his next appt as appropriate.  Yan dc'd stable via his w/c, accompanied by care giver.

## 2021-06-14 NOTE — PROGRESS NOTES
"Prolia Injection Phone Screen      Screening questions have been asked 2-3 days prior to administration visit for Prolia. If any questions are answered with \"Yes,\" this phone encounter were will routed to ordering provider for further evaluation.     1.  When was the last injection?  7/9/20    2.  Has insurance for this injection been verified?  Yes    3.  Did you experience any new onset achiness or rashes that lasted for over a month with your previous Prolia injection?   No    4.  Do you have a fever over 101?F or a new deep cough that is unusual for you today? No    5.  Have you started any new medications in the last 6 months that you were told could affect your immune system? These may have been prescribed by oncologist, transplant, rheumatology, or dermatology.   No    6.  In the last 6 months have you have gastric bypass or parathyroid surgery?   No    7.  Do you plan dental work requiring drilling into the bone such as implants/extractions or oral surgery in the next 2-3 months?   No    8. Do you have new insurance since the last injection?    Patient informed if symptoms discussed above present prior to their administration appointment, they are to notify clinic immediately.     Catherine Issa          The following steps were completed to comply with the REMS program for Prolia:  1. Ordering provider has previously reviewed information in the Medication Guide and Patient Counseling Chart, including the serious risks of Prolia  and the symptoms of each risk and have been advised to seek prompt medical attention if they have signs or symptoms of any of the serious risks.  2. Provided each patient a copy of the Medication Guide and Patient Brochure.  See MAR for administration details.   Indication: Prolia  (denosumab) is a prescription medicine used to treat osteoporosis in patients who:   Are at high risk for fracture, meaning patients who have had a fracture related to osteoporosis, or who have multiple " risk factors for fracture; Cannot use another osteoporosis medicine or other osteoporosis medicines did not work well.   The timeline for early/late injections would be 4 weeks early and any time after the 6 month malick. If a patient receives their injection late, then the subsequent injection would be 6 months from the date that they actually received the injection    Have the screening questions been asked prior to this administration? Yes        After obtaining consent, and per orders of Kathy Ramírez NP, injection of Prolia 60 mg given by Catherine Issa. Patient instructed to remain in clinic for 20 minutes afterwards, and to report any adverse reaction to me immediately.

## 2021-06-15 PROBLEM — J18.9 CAP (COMMUNITY ACQUIRED PNEUMONIA): Status: ACTIVE | Noted: 2017-05-14

## 2021-06-15 PROBLEM — T17.800A ASPIRATION INTO LOWER RESPIRATORY TRACT: Status: ACTIVE | Noted: 2017-05-17

## 2021-06-16 PROBLEM — M81.0 OSTEOPOROSIS: Status: ACTIVE | Noted: 2017-07-27

## 2021-06-16 PROBLEM — H55.00 NYSTAGMUS: Status: ACTIVE | Noted: 2017-10-26

## 2021-06-19 NOTE — PROGRESS NOTES
"Progress Note    Reason for Visit:  Chief Complaint     Osteoporosis          Progress Note:    HPI:   This patient is\" at the request of the primary care physician because of osteoporosis.  Thank you for referring this pleasant 47-year-old male patient who came to the clinic accompanied by her caregiver.  The patient has quadriplegia and was on a wheelchair due to cerebral palsy.    The patient does not communicate the caregiver was given but answers.  He also has past medical history of seizures he is hard of hearing or has hearing loss and has cortical blindness.    The patient is known to have osteoporosis for so many years he has been on Boniva on and off for several years he takes an injection once every 3 months he has been off it for 1 year.    He had a bone DEXA scan T score at the spine was -2.4 at the left hip is -4 at the right hip is -4.2.    Creatinine is 0.63    Family history.    He takes comes one tablet twice a day Tegretol 300 mg 3 times a day but hasn't caught 1250 mg 3 times a day Thousand Milligrams Twice a Day Omeprazole 20 Mg.      BONE MINERAL DENSITY (DEXA) EXAM  12/14/2016 11:36 AM     INDICATION: Follow-up. Non-weightbearing. Osteoporosis.  COMPARISON: None.     REGION:  Lumbar spine L1-L4 BMD: 0.936 g/cm sq  T Score: -2.4  Z Score: -2.3     Total left proximal femur BMD: 0.594 g/cm sq  T Score: -3.5  Z Score: -3.3     Left femoral neck BMD: 0.547 g/cm sq  T Score: -4.0  Z Score: -3.6     Total right proximal femur BMD: 0.508 g/cm sq  T Score: -4.1  Z Score: -3.8     Right femoral neck BMD: 0.520 g/cm sq  T Score: -4.2  Z Score: -3.8     LUMBAR SPINE: The bone mineral density values demonstrate Osteopenia.   TOTAL LEFT PROXIMAL FEMUR: The bone mineral density values demonstrate Osteoporosis.   LEFT FEMORAL NECK: The bone mineral density values demonstrate Osteoporosis.  TOTAL RIGHT PROXIMAL FEMUR: The bone mineral density values demonstrate Osteoporosis.   RIGHT FEMORAL NECK: The bone " mineral density values demonstrate Osteoporosis.     IMPRESSION:   CONCLUSION:  1.  Osteoporosis.    Review of Systems:    Nervous System: No headache, dizziness, fainting or memory loss. No tingling sensation of hand or feet.  Ears: No hearing loss or ringing in the ears  Eyes: No blurring of vision, redness, itching or dryness.  Nose: No nosebleed or loss of smell  Mouth: No mouth sores or loss of taste  Throat: No hoarseness or difficulty swallowing  Neck: No enlarged thyroid or lymph nodes.  Heart: No chest pain, palpitation or irregular heartbeat. No swelling of hands or feet  Lungs: No shortness of breath, cough, night sweats, wheezing or hemoptysis.  Gastrointestinal: No nausea or vomiting, constipation or diarrhea.  No acid reflux, abdominal pain or blood in stools.  Kidney/Bladdr: No polyuria, polydipsia, nocturia or hematuria.  Genital/Sexual: No loss of libido  Skin: No rash, hair loss or hirsutism.  No abnormal striae  Muscles/Joints/Bones: No morning stiffness, muscle aches and pain or loss of height.    Current Medications:  Current Outpatient Prescriptions   Medication Sig     acetaminophen (TYLENOL) 500 MG tablet Take 1,000 mg by mouth every 6 (six) hours as needed for pain.     acyclovir (ZOVIRAX) 400 MG tablet Take 400 mg by mouth as needed (3 times daily x 5 days if needed fdor cold sores).     adapalene (DIFFERIN) 0.1 % cream Apply 1 application topically bedtime.     bacitracin ointment Apply 1 application topically daily as needed (open areas from scratches).     bisacodyl (DULCOLAX) 10 mg suppository Insert 10 mg into the rectum daily as needed.     calcium, as carbonate, (TUMS) 200 mg calcium (500 mg) chewable tablet Chew 1 tablet 2 (two) times a day.     caloric supplement (BENECALORIE ORAL) Take by mouth. Take twice daily     carBAMazepine (TEGRETOL) 200 mg tablet Take 300 mg by mouth 3 (three) times a day. morning and evening     carBAMazepine (TEGRETOL) 200 mg tablet Take 100 mg by mouth  3 (three) times a day.      cholecalciferol, vitamin D3, 1,000 unit tablet Take 1,000 Units by mouth daily.     divalproex (DEPAKOTE SPRINKLE) 125 mg capsule Take 1,250 mg by mouth 3 (three) times a day. 10 x 125mg caps , open caps     erythromycin with ethanol (EMGEL) 2 % gel Apply topically daily as needed (for acne).     fluticasone (FLONASE) 50 mcg/actuation nasal spray 1 spray into each nostril daily.     lactulose 10 gram/15 mL solution Take 20 g by mouth 2 (two) times a day.     levETIRAcetam (KEPPRA) 500 MG tablet Take 1,000 mg by mouth 2 (two) times a day.      loratadine (CLARITIN) 10 mg tablet Take 10 mg by mouth daily. From April-September every year and as needed     LORazepam (ATIVAN) 2 mg/mL concentrated solution Take 1 mg by mouth as needed (for seizures).     mineral oil liquid Administer 1 mL into both ears once a week. On saturdays     omeprazole (PRILOSEC) 20 MG capsule Take 20 mg by mouth every evening.     ondansetron (ZOFRAN-ODT) 4 MG disintegrating tablet Place 4 mg under the tongue every 8 (eight) hours as needed.     polyethylene glycol (MIRALAX) 17 gram packet Take 17 g by mouth daily.     ibandronate (BONIVA) 3 mg/3 mL Syrg Infuse 3 mg into a venous catheter every 3 (three) months.       Patients Active Problems:  Patient Active Problem List   Diagnosis     Hypernatremia     Seizures (H)     Mental disability     Cerebral palsy (H)     Status epilepticus (H)     Chronic anemia     Leucopenia     Hypomagnesemia     CAP (community acquired pneumonia)     Spastic quadriplegic cerebral palsy (H)     Seizure disorder (H)     At risk for aspiration     Aspiration into lower respiratory tract     Osteoporosis       History:   reports that he has never smoked. He has never used smokeless tobacco. He reports that he does not drink alcohol or use illicit drugs.   reports that he has never smoked. He has never used smokeless tobacco. He reports that he does not drink alcohol or use illicit  drugs.  History   Smoking Status     Never Smoker   Smokeless Tobacco     Never Used      reports that he has never smoked. He has never used smokeless tobacco. He reports that he does not drink alcohol or use illicit drugs.  History   Sexual Activity     Sexual activity: Not on file     Past Medical History:   Diagnosis Date     At risk for aspiration     Puréed diet with thickened liquids     Cerebral palsy (H)      Cortical blindness      Hearing loss      Mental disability      Nonverbal      Osteoporosis      Quadriparesis (H)      Seasonal allergies      Seizures (H)      Wheelchair bound      Family History   Problem Relation Age of Onset     Other Mother      Unknown-family not involved     Other Father      Unknown family not involved     Cerebral palsy Sister      Past Medical History:   Diagnosis Date     At risk for aspiration     Puréed diet with thickened liquids     Cerebral palsy (H)      Cortical blindness      Hearing loss      Mental disability      Nonverbal      Osteoporosis      Quadriparesis (H)      Seasonal allergies      Seizures (H)      Wheelchair bound      Past Surgical History:   Procedure Laterality Date     none       PICC AND MIDLINE TEAM LINE INSERTION  5/15/2017            Vitals   vitals were not taken for this visit.        Exam  General appearance: The patient looked well, not in acute distress.  Eyes: no evidence of thyroid eye disease.   Retinal exam: No evidence of diabetic retinopathy.  Mouth and Throat: Normal  Neck: No evidence of thyromegaly, enlarged lymph node or tenderness  Chest: Trachea is central. Chest is clear to auscultation and percussion. Breat sounds are normal.  Cardiovascular exam: JVP is not raised. Heart sounds are normal, no murmurs or rub  Peripheral pulses are palpable.   Abdomen: No masses or tenderness.    Back: No vertebral tenderness or kyphosis.  Extremities: No evidence of leg edema.   Skin: Normal to touch.  No abnormal striae  Neurologic exam:   Visual fields are intact by confrontation, grossly intact. No evidence of peripheral neuropathy.  Detailed foot exam normal.        Diagnosis:  No diagnosis found.    Orders:   No orders of the defined types were placed in this encounter.        Assessment and Plan:   Osteoporosis This Is Mainly Due To Disuse Osteoporosis and His Cerebral Palsy Since the Patient Has Been on Wheelchair And Is Handicapped.  The Patient Does Not Smoke of Drink Has Been on Boniva On and off for Several Years Has Been off It for 1 Year.    I Discussed with the [place]ins the Caregiver .  The Bone DEXA Scan Still Showed Significant Osteoporosis So I Advised Her to Stay off Boniva And We Will Contact Her Insurance to   Approved Prolia 60 Mg Subcutaneously Every 6 Months    We Will  Monitor Calcium and Vitamin D.    That Does Not Seem to Secondary to Osteoporosis.    Cerebral Palsy     Epilepsy Controlled on Medication.    Patient Will Return to Clinic in 1 Year.    I have reviewed and ordered clinical lab test    I have reviewed and ordered radiology tests.    I have reviewed and ordered her medication as required.    I have reviewed her test results and advised with the performing physician.    I have reviewed the patient's old records.    I have reviewed and summarized the patient old records.    I did spend 45 minutes with the patient more than 50% was spent on counseling and managing her care.

## 2021-06-20 NOTE — LETTER
Letter by Kathy Ramírez NP at      Author: Kathy Ramírez NP Service: -- Author Type: --    Filed:  Encounter Date: 3/17/2020 Status: (Other)         Yan Palacio  [address] [place] [address]             March 17, 2020         Dear Mr. Palacio,    Below are the results from your recent visit:    Resulted Orders   DXA Bone Density Scan    Narrative    3/13/2020      RE: Yan Palacio  YOB: 1970        Dear Kathy Ramírez,    Patient Profile:  49 y.o. male, is here for the first bone density test.   History of fractures ?- Yes. Family history of osteoporosis - Yes.  Family   history of hip fracture: Unknown. Smoking history - No. Osteoporosis   treatment past ?-  No. Osteoporosis treatment current ?- Yes;  Prolia.    Chronic medical problems - None. High risk medications ?-  Anti-seizure;    Yes, Currently.      Assessment:    1. The spine bone density L1-L4 with T-score -2.1.  The Z-score is -1.9.  2. Femoral bone densities show left total hip T- score -3.5 with a Z-score   of -3.1.  The right total hip could not be accurately assessed due to   difficulty with placement in the scanner..  3. Trabecular bone score indicates moderate trabecular bone architecture.      49 y.o. male with OSTEOPOROSIS and HIGH fracture risk.     The previous bone densitometry was performed on a different scanner-(2016)   and therefore, the results are not directly comparable.    Recommendations:  A continuation of the current treatment is recommended with follow up bone   density scan in 1 to 2 years.      Bone densitometry was performed on your patient using our [employer]   densitometer. The results are summarized and a copy of the actual scans   are included for your review. In conformity with the International Society   of Clinical Densitometry's most recent position statement for DXA   interpretation (2015), the diagnosis will be made on the lowest measured   T-score of the lumbar spine,  femoral neck, total proximal femur or 33%   radius. Note the change in terminology for diagnostic classification from   OSTEOPENIA to LOW BONE MASS. All trending for sequential exams will be   done using multiple vertebrae or the total proximal femur. Fracture risk   is based on the WHO Fracture Risk Assessment Tool (FRAX). If additional   information is needed or if you would like to discuss the results, please   do not hesitate to call me.       Thank you for referring this patient to [facility].   We are happy to be of service in support of you and your practice. If you   have any questions or suggestions to improve our service, please call me   at [phone].     Sincerely,     Aliyah Escalante M.D. C.C.ALE.  O[facility]oporosis Services, [facility]     The test results show that your current treatment is working. Please continue your current medication and plan. We recommend that you repeat the above test(s) in 2 years.    Please call with questions or contact us using [employer].    Sincerely,        Electronically signed by Kathy Ramírez NP

## 2021-06-23 NOTE — PROGRESS NOTES
"Prolia Injection Phone Screen      Screening questions have been asked 2-3 days prior to administration visit for Prolia. If any questions are answered with \"Yes,\" this phone encounter were will routed to ordering provider for further evaluation.     1.  When was the last injection?  7/27/18    2.  Has insurance for this injection been verified?  Yes    3.  Did you experience any new onset achiness or rashes that lasted for over a month with your previous Prolia injection?   No    4.  Do you have a fever over 101?F or a new deep cough that is unusual for you today? No    5.  Have you started any new medications in the last 6 months that you were told could affect your immune system? These may have been prescribed by oncologist, transplant, rheumatology, or dermatology.   No    6.  In the last 6 months have you have gastric bypass or parathyroid surgery?   No    7.  Do you plan dental work requiring drilling into the bone such as implants/extractions or oral surgery in the next 2-3 months?   No    8. Do you have new insurance since the last injection?    Patient informed if symptoms discussed above present prior to their administration appointment, they are to notify clinic immediately.     Catherine Issa      The following steps were completed to comply with the REMS program for Prolia:  1. Ordering provider has previously reviewed information in the Medication Guide and Patient Counseling Chart, including the serious risks of Prolia  and the symptoms of each risk and have been advised to seek prompt medical attention if they have signs or symptoms of any of the serious risks.  2. Provided each patient a copy of the Medication Guide and Patient Brochure.  See MAR for administration details.   Indication: Prolia  (denosumab) is a prescription medicine used to treat osteoporosis in patients who:   Are at high risk for fracture, meaning patients who have had a fracture related to osteoporosis, or who have multiple " risk factors for fracture; Cannot use another osteoporosis medicine or other osteoporosis medicines did not work well.   The timeline for early/late injections would be 4 weeks early and any time after the 6 month malick. If a patient receives their injection late, then the subsequent injection would be 6 months from the date that they actually received the injection    Have the screening questions been asked prior to this administration? Yes      After obtaining consent, and per orders of Dr. Marrufo, injection of Prolia 60 mg given by Catherine Issa. Patient instructed to remain in clinic for 20 minutes afterwards, and to report any adverse reaction to me immediately.

## 2021-07-07 NOTE — TELEPHONE ENCOUNTER
Telephone Encounter by Cassidy Basurto at 7/7/2021  1:51 PM     Author: Cassidy Basurto Service: -- Author Type: --    Filed: 7/7/2021  1:51 PM Encounter Date: 7/7/2021 Status: Addendum    : Cassidy Basurto    Related Notes: Original Note by Cassidy Basurto filed at 7/7/2021  1:51 PM       ----- Message from Catherine Issa RN sent at 7/2/2021  8:09 AM CDT -----  Regarding: FW: Prolia last inj 2/2/21  Please call the pt to schedule prolia 8/3 or after. Thanks

## 2021-07-07 NOTE — TELEPHONE ENCOUNTER
Telephone Encounter by Cassidy Basurto at 7/7/2021  1:52 PM     Author: Cassidy Basurto Service: -- Author Type: --    Filed: 7/7/2021  1:53 PM Encounter Date: 7/7/2021 Status: Signed    : Cassidy Basurto       7/7 - called group home x 1 - spoke with shilpi, states that patient cannot get injection until October. Patient had dental work done. Per Shilpi, patient wasn't even suppose to get his last prolia with us in Feb 2021, due to needing dental work.    She will call us back to schedule after discussing with dentist, etc.

## 2021-07-08 NOTE — TELEPHONE ENCOUNTER
Telephone Encounter by Kathy Ramírez NP at 7/8/2021  7:57 AM     Author: Kathy Ramírez NP Service: -- Author Type: Nurse Practitioner    Filed: 7/8/2021  7:57 AM Encounter Date: 7/7/2021 Status: Signed    : Kathy Ramírez NP (Nurse Practitioner)       Noted

## 2021-07-15 NOTE — PROGRESS NOTES
As part of the required manual data conversion process for integration, this encounter was created to document a CAM (Clinic Administered Medication) order. This information was copied from the [facility] patient's chart to the [facility] patient chart.     Raisa Nelson PharmD  July 15, 2021

## 2021-10-19 NOTE — PROGRESS NOTES
"Prolia Injection Phone Screen      Screening questions have been asked 2-3 days prior to administration visit for Prolia. If any questions are answered with \"Yes,\" this phone encounter were will routed to ordering provider for further evaluation.     1.  When was the last injection?  02/02/21    2.  Has insurance for this injection been verified?  Yes    3.  Did you experience any new onset achiness or rashes that lasted for over a month with your previous Prolia injection?   No    4.  Do you have a fever over 101?F or a new deep cough that is unusual for you today? No    5.  Have you started any new medications in the last 6 months that you were told could affect your immune system? These may have been prescribed by oncologist, transplant, rheumatology, or dermatology.   No    6.  In the last 6 months have you have gastric bypass or parathyroid surgery?   No    7.  Do you plan dental work requiring drilling into the bone such as implants/extractions or oral surgery in the next 2-3 months?   No    8. Do you have new insurance since the last injection? No    Patient informed if symptoms discussed above present prior to their administration appointment, they are to notify clinic immediately.     Renetta LUDWIG RN          The following steps were completed to comply with the REMS program for Prolia:  1. Ordering provider has previously reviewed information in the Medication Guide and Patient Counseling Chart, including the serious risks of Prolia  and the symptoms of each risk and have been advised to seek prompt medical attention if they have signs or symptoms of any of the serious risks.  2. Provided each patient a copy of the Medication Guide and Patient Brochure.  See MAR for administration details.   Indication: Prolia  (denosumab) is a prescription medicine used to treat osteoporosis in patients who:   Are at high risk for fracture, meaning patients who have had a fracture related to osteoporosis, or who " have multiple risk factors for fracture; Cannot use another osteoporosis medicine or other osteoporosis medicines did not work well.   The timeline for early/late injections would be 4 weeks early and any time after the 6 month malick. If a patient receives their injection late, then the subsequent injection would be 6 months from the date that they actually received the injection    Have the screening questions been asked prior to this administration? Yes    Clinic Administered Medication Documentation     The following medication was given:     MEDICATION: Denosumab (Prolia) 60 mg/ml SOLN  ROUTE: SQ  SITE: Arm - Right  DOSE: 60mg  LOT #: [id number]  :  [employer]  EXPIRATION DATE:  02/24

## 2022-04-19 ENCOUNTER — TELEPHONE (OUTPATIENT)
Dept: ENDOCRINOLOGY | Facility: CLINIC | Age: 52
End: 2022-04-19

## 2024-01-24 NOTE — TELEPHONE ENCOUNTER
Orders placed   Were you able to read the message from Dr Cohen? If so  then No you do not need to graciela us back Thanks